# Patient Record
Sex: MALE | Race: WHITE | URBAN - METROPOLITAN AREA
[De-identification: names, ages, dates, MRNs, and addresses within clinical notes are randomized per-mention and may not be internally consistent; named-entity substitution may affect disease eponyms.]

---

## 2022-03-28 ENCOUNTER — OUTPATIENT (OUTPATIENT)
Dept: OUTPATIENT SERVICES | Facility: HOSPITAL | Age: 82
LOS: 1 days | End: 2022-03-28
Payer: MEDICARE

## 2022-03-28 DIAGNOSIS — R13.10 DYSPHAGIA, UNSPECIFIED: ICD-10-CM

## 2022-03-28 PROCEDURE — 74230 X-RAY XM SWLNG FUNCJ C+: CPT

## 2022-03-28 PROCEDURE — 74230 X-RAY XM SWLNG FUNCJ C+: CPT | Mod: 26

## 2022-03-28 PROCEDURE — 92611 MOTION FLUOROSCOPY/SWALLOW: CPT | Mod: GN

## 2022-03-28 NOTE — SWALLOW VFSS/MBS ASSESSMENT ADULT - ORAL PREP COMMENTS
The patient willingly accepted barium altered food materials and demonstrated functional labial grading on utensils. No dribbling was noted.

## 2022-03-28 NOTE — SWALLOW VFSS/MBS ASSESSMENT ADULT - ADDITIONAL INFORMATION
The patient was postured upright in a CHANTAL chair for testing and was studied in the lateral plane.

## 2022-03-28 NOTE — SWALLOW VFSS/MBS ASSESSMENT ADULT - CONSISTENCIES ADMINISTERED
All PO was either injected or coated with barium for contrast purposes./thin liquid/mildly thick/pureed/easy to chew/regular solid

## 2022-03-28 NOTE — SWALLOW VFSS/MBS ASSESSMENT ADULT - DIAGNOSTIC IMPRESSIONS
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  NO LARYNGEAL PENETRATION, ASPIRATION OR LARYNGOPHARYNGEAL REFLUX WERE IDENTIFIED UNDER FLUOROSCOPIC CONTROL, DESPITE MILD PHARYNGEAL DYSPHAGIA. OF NOTE IS THAT A MILD/MILD+ AMOUNT OF POST PRANDIAL PHARYNGEAL RETENTION WAS VISUALIZED IN THE [place] WITH PUREE FOODS AS WELL AS FOODS THAT REQUIRE MASTICATION. OF FURTHER NOTE IS THAT PHARYNGEAL STASIS WAS CLEARED FOLLOWING SPONTANEOUS IMPLEMENTATION OF A REPEAT DRY SWALLOW AFTER PRIMARY SWALLOWING TRIALS AS WELL AS ALTERNATING LIQUIDS WITH MORE COARSE FOOD CONSISTENCIES(CYCLIC INGESTION).

## 2022-03-28 NOTE — SWALLOW VFSS/MBS ASSESSMENT ADULT - COMMENTS
The patient was referred for an outpatient Modified Barium Swallowing Study(MBS) upon referral of Dr Blake. Medical history is remarkable for HTN, HLD , BPH, inguinal hernia and prior stroke. The patient stated that he underwent inpatient rehab at [facility] post CVA. At [facility], he was reportedly on a soft diet with thick liquids which is currently continued at home. The patient received swallowing therapy at [facility] and also receives home care swallow therapy at the present time for presumed Dysphagia.

## 2022-03-28 NOTE — SWALLOW VFSS/MBS ASSESSMENT ADULT - PHARYNGEAL PHASE COMMENTS
Swallow triggered in an acceptable time frame for age. Swallow triggered in an acceptable time frame for age. Hyolaryngeal excursion and epiglottic retroflexion during the swallow were mildly reduced but felt to be grossly functional with grossly sufficient prandial airway protection. Pharyngeal motility was mildly decreased but felt to be functional as well. Cricopharyngeal sphincter opening was felt to be acceptable. NO LARYNGEAL PENETRATION, ASPIRATION OR LARYNGOPHARYNGEAL REFLUX WERE IDENTIFIED UNDER FLUOROSCOPIC CONTROL. OF NOTE IS THAT A MILD/MILD+ AMOUNT OF POST PRANDIAL PHARYNGEAL RETENTION WAS VISUALIZED IN THE [place] WITH PUREE FOODS AS WELL AS FOODS THAT REQUIRE MASTICATION. OF FURTHER NOTE IS THAT PHARYNGEAL STASIS WAS CLEARED FOLLOWING SPONTANEOUS IMPLEMENTATION OF A REPEAT DRY SWALLOW AFTER PRIMARY SWALLOWING TRIALS AS WELL AS ALTERNATING LIQUIDS WITH MORE COARSE FOOD CONSISTENCIES(CYCLIC INGESTION).

## 2022-03-28 NOTE — SWALLOW VFSS/MBS ASSESSMENT ADULT - ADDITIONAL RECOMMENDATIONS
1) SUGGEST A REGULAR TEXTURE DIET WITH THIN LIQUID CONSISTENCIES AS PATIENT TOLERATES THESE FOOD CONSISTENCIES FROM AN OROPHARYNGEAL SWALLOWING PERSPECTIVE, DESPITE MILD PHARYNGEAL DYSPHAGIA. NO FURTHER NEED FOR USE OF BEVERAGE THICKENERS.     2) THE PATIENT SHOULD EAT AT AS CLOSE TO A 90 DEGREE ANGLE AS POSSIBLE WHEN EATING/FOR AT LEAST 20 MINUTES AFTER FEEDING. ADDITIONALLY, PT IS ENCOURAGED TO ALTERNATE LIQUIDS WITH MORE COARSE FOODS FREQUENTLY AND TO PERIODICALLY EMPLOY A DRY SWALLOW BETWEEN INTAKES. THESE STRATEGIES MAXIMIZED SWALLOWING SAFETY/EFFICIENCY DURING MBS PROCEDURE.     3) CONTINUE SWALLOW THERAPY WITH HOME CARE SPEECH PATHOLOGIST. CANDIDACY FOR DISCHARGING PATIENT FROM THERAPY PROGRAM IS AT DISCRETION OF TREATING SPEECH PATHOLOGIST.    4) RECONSULT VINH CORDERO MA, [facility]    , SPEECH PATHOLOGY     [phone]

## 2022-03-29 DIAGNOSIS — R13.10 DYSPHAGIA, UNSPECIFIED: ICD-10-CM

## 2025-07-31 ENCOUNTER — NON-APPOINTMENT (OUTPATIENT)
Age: 85
End: 2025-07-31